# Patient Record
Sex: FEMALE | Race: WHITE | NOT HISPANIC OR LATINO | Employment: UNEMPLOYED | URBAN - NONMETROPOLITAN AREA
[De-identification: names, ages, dates, MRNs, and addresses within clinical notes are randomized per-mention and may not be internally consistent; named-entity substitution may affect disease eponyms.]

---

## 2020-10-01 ENCOUNTER — APPOINTMENT (OUTPATIENT)
Dept: GENERAL RADIOLOGY | Facility: HOSPITAL | Age: 25
End: 2020-10-01

## 2020-10-01 ENCOUNTER — HOSPITAL ENCOUNTER (INPATIENT)
Facility: HOSPITAL | Age: 25
LOS: 5 days | Discharge: HOME | End: 2020-10-06
Attending: PSYCHIATRY & NEUROLOGY | Admitting: PSYCHIATRY & NEUROLOGY

## 2020-10-01 ENCOUNTER — HOSPITAL ENCOUNTER (EMERGENCY)
Facility: HOSPITAL | Age: 25
Discharge: STILL ADMITTED OR ADMINISTRATIVE | End: 2020-10-01
Attending: FAMILY MEDICINE

## 2020-10-01 ENCOUNTER — APPOINTMENT (OUTPATIENT)
Dept: CT IMAGING | Facility: HOSPITAL | Age: 25
End: 2020-10-01

## 2020-10-01 VITALS
BODY MASS INDEX: 31.01 KG/M2 | RESPIRATION RATE: 18 BRPM | DIASTOLIC BLOOD PRESSURE: 95 MMHG | WEIGHT: 175 LBS | SYSTOLIC BLOOD PRESSURE: 139 MMHG | HEART RATE: 95 BPM | TEMPERATURE: 98.9 F | OXYGEN SATURATION: 99 % | HEIGHT: 63 IN

## 2020-10-01 DIAGNOSIS — F11.20 BUPRENORPHINE DEPENDENCE (HCC): ICD-10-CM

## 2020-10-01 DIAGNOSIS — F15.10 AMPHETAMINE ABUSE (HCC): Primary | ICD-10-CM

## 2020-10-01 PROBLEM — F19.10 POLYSUBSTANCE ABUSE (HCC): Status: ACTIVE | Noted: 2020-10-01

## 2020-10-01 LAB
6-ACETYL MORPHINE: NEGATIVE
ALBUMIN SERPL-MCNC: 3.92 G/DL (ref 3.5–5.2)
ALBUMIN/GLOB SERPL: 1.6 G/DL
ALP SERPL-CCNC: 91 U/L (ref 39–117)
ALT SERPL W P-5'-P-CCNC: 14 U/L (ref 1–33)
AMPHET+METHAMPHET UR QL: POSITIVE
ANION GAP SERPL CALCULATED.3IONS-SCNC: 12.2 MMOL/L (ref 5–15)
AST SERPL-CCNC: 15 U/L (ref 1–32)
B-HCG UR QL: NEGATIVE
BARBITURATES UR QL SCN: NEGATIVE
BASOPHILS # BLD AUTO: 0.05 10*3/MM3 (ref 0–0.2)
BASOPHILS # BLD AUTO: 0.05 10*3/MM3 (ref 0–0.2)
BASOPHILS NFR BLD AUTO: 0.7 % (ref 0–1.5)
BASOPHILS NFR BLD AUTO: 0.7 % (ref 0–1.5)
BENZODIAZ UR QL SCN: POSITIVE
BILIRUB SERPL-MCNC: 0.3 MG/DL (ref 0–1.2)
BILIRUB UR QL STRIP: NEGATIVE
BUN SERPL-MCNC: 12 MG/DL (ref 6–20)
BUN/CREAT SERPL: 17.4 (ref 7–25)
BUPRENORPHINE SERPL-MCNC: POSITIVE NG/ML
CALCIUM SPEC-SCNC: 8.3 MG/DL (ref 8.6–10.5)
CANNABINOIDS SERPL QL: POSITIVE
CHLORIDE SERPL-SCNC: 102 MMOL/L (ref 98–107)
CLARITY UR: ABNORMAL
CO2 SERPL-SCNC: 22.8 MMOL/L (ref 22–29)
COCAINE UR QL: NEGATIVE
COLOR UR: ABNORMAL
CREAT SERPL-MCNC: 0.69 MG/DL (ref 0.57–1)
DEPRECATED RDW RBC AUTO: 45.3 FL (ref 37–54)
DEPRECATED RDW RBC AUTO: 46.8 FL (ref 37–54)
EOSINOPHIL # BLD AUTO: 0.21 10*3/MM3 (ref 0–0.4)
EOSINOPHIL # BLD AUTO: 0.32 10*3/MM3 (ref 0–0.4)
EOSINOPHIL NFR BLD AUTO: 3 % (ref 0.3–6.2)
EOSINOPHIL NFR BLD AUTO: 4.5 % (ref 0.3–6.2)
ERYTHROCYTE [DISTWIDTH] IN BLOOD BY AUTOMATED COUNT: 14.5 % (ref 12.3–15.4)
ERYTHROCYTE [DISTWIDTH] IN BLOOD BY AUTOMATED COUNT: 14.6 % (ref 12.3–15.4)
ETHANOL BLD-MCNC: <10 MG/DL (ref 0–10)
ETHANOL UR QL: <0.01 %
GFR SERPL CREATININE-BSD FRML MDRD: 102 ML/MIN/1.73
GLOBULIN UR ELPH-MCNC: 2.4 GM/DL
GLUCOSE SERPL-MCNC: 119 MG/DL (ref 65–99)
GLUCOSE UR STRIP-MCNC: NEGATIVE MG/DL
HCT VFR BLD AUTO: 37.1 % (ref 34–46.6)
HCT VFR BLD AUTO: 37.9 % (ref 34–46.6)
HGB BLD-MCNC: 11.8 G/DL (ref 12–15.9)
HGB BLD-MCNC: 12.3 G/DL (ref 12–15.9)
HGB UR QL STRIP.AUTO: NEGATIVE
IMM GRANULOCYTES # BLD AUTO: 0.05 10*3/MM3 (ref 0–0.05)
IMM GRANULOCYTES # BLD AUTO: 0.05 10*3/MM3 (ref 0–0.05)
IMM GRANULOCYTES NFR BLD AUTO: 0.7 % (ref 0–0.5)
IMM GRANULOCYTES NFR BLD AUTO: 0.7 % (ref 0–0.5)
KETONES UR QL STRIP: NEGATIVE
LEUKOCYTE ESTERASE UR QL STRIP.AUTO: NEGATIVE
LYMPHOCYTES # BLD AUTO: 2.12 10*3/MM3 (ref 0.7–3.1)
LYMPHOCYTES # BLD AUTO: 2.23 10*3/MM3 (ref 0.7–3.1)
LYMPHOCYTES NFR BLD AUTO: 29.9 % (ref 19.6–45.3)
LYMPHOCYTES NFR BLD AUTO: 31.9 % (ref 19.6–45.3)
MAGNESIUM SERPL-MCNC: 2 MG/DL (ref 1.6–2.6)
MCH RBC QN AUTO: 27.8 PG (ref 26.6–33)
MCH RBC QN AUTO: 28 PG (ref 26.6–33)
MCHC RBC AUTO-ENTMCNC: 31.8 G/DL (ref 31.5–35.7)
MCHC RBC AUTO-ENTMCNC: 32.5 G/DL (ref 31.5–35.7)
MCV RBC AUTO: 85.6 FL (ref 79–97)
MCV RBC AUTO: 87.9 FL (ref 79–97)
METHADONE UR QL SCN: NEGATIVE
MONOCYTES # BLD AUTO: 0.52 10*3/MM3 (ref 0.1–0.9)
MONOCYTES # BLD AUTO: 0.71 10*3/MM3 (ref 0.1–0.9)
MONOCYTES NFR BLD AUTO: 10.1 % (ref 5–12)
MONOCYTES NFR BLD AUTO: 7.3 % (ref 5–12)
NEUTROPHILS NFR BLD AUTO: 3.75 10*3/MM3 (ref 1.7–7)
NEUTROPHILS NFR BLD AUTO: 4.04 10*3/MM3 (ref 1.7–7)
NEUTROPHILS NFR BLD AUTO: 53.6 % (ref 42.7–76)
NEUTROPHILS NFR BLD AUTO: 56.9 % (ref 42.7–76)
NITRITE UR QL STRIP: NEGATIVE
NRBC BLD AUTO-RTO: 0 /100 WBC (ref 0–0.2)
NRBC BLD AUTO-RTO: 0 /100 WBC (ref 0–0.2)
OPIATES UR QL: NEGATIVE
OXYCODONE UR QL SCN: NEGATIVE
PCP UR QL SCN: NEGATIVE
PH UR STRIP.AUTO: 6 [PH] (ref 5–8)
PLATELET # BLD AUTO: 244 10*3/MM3 (ref 140–450)
PLATELET # BLD AUTO: 266 10*3/MM3 (ref 140–450)
PMV BLD AUTO: 10 FL (ref 6–12)
PMV BLD AUTO: 10.1 FL (ref 6–12)
POTASSIUM SERPL-SCNC: 3.2 MMOL/L (ref 3.5–5.2)
PROT SERPL-MCNC: 6.3 G/DL (ref 6–8.5)
PROT UR QL STRIP: NEGATIVE
RBC # BLD AUTO: 4.22 10*6/MM3 (ref 3.77–5.28)
RBC # BLD AUTO: 4.43 10*6/MM3 (ref 3.77–5.28)
SARS-COV-2 RNA RESP QL NAA+PROBE: NOT DETECTED
SODIUM SERPL-SCNC: 137 MMOL/L (ref 136–145)
SP GR UR STRIP: >1.03 (ref 1–1.03)
TROPONIN T SERPL-MCNC: <0.01 NG/ML (ref 0–0.03)
UROBILINOGEN UR QL STRIP: ABNORMAL
WBC # BLD AUTO: 7 10*3/MM3 (ref 3.4–10.8)
WBC # BLD AUTO: 7.1 10*3/MM3 (ref 3.4–10.8)

## 2020-10-01 PROCEDURE — 85025 COMPLETE CBC W/AUTO DIFF WBC: CPT | Performed by: FAMILY MEDICINE

## 2020-10-01 PROCEDURE — 80307 DRUG TEST PRSMV CHEM ANLYZR: CPT | Performed by: FAMILY MEDICINE

## 2020-10-01 PROCEDURE — 99285 EMERGENCY DEPT VISIT HI MDM: CPT

## 2020-10-01 PROCEDURE — 81003 URINALYSIS AUTO W/O SCOPE: CPT | Performed by: FAMILY MEDICINE

## 2020-10-01 PROCEDURE — 93010 ELECTROCARDIOGRAM REPORT: CPT | Performed by: INTERNAL MEDICINE

## 2020-10-01 PROCEDURE — 84484 ASSAY OF TROPONIN QUANT: CPT | Performed by: HOSPITALIST

## 2020-10-01 PROCEDURE — 82150 ASSAY OF AMYLASE: CPT | Performed by: HOSPITALIST

## 2020-10-01 PROCEDURE — 87635 SARS-COV-2 COVID-19 AMP PRB: CPT | Performed by: FAMILY MEDICINE

## 2020-10-01 PROCEDURE — 72052 X-RAY EXAM NECK SPINE 6/>VWS: CPT | Performed by: RADIOLOGY

## 2020-10-01 PROCEDURE — 81025 URINE PREGNANCY TEST: CPT | Performed by: FAMILY MEDICINE

## 2020-10-01 PROCEDURE — 83605 ASSAY OF LACTIC ACID: CPT | Performed by: HOSPITALIST

## 2020-10-01 PROCEDURE — 93005 ELECTROCARDIOGRAM TRACING: CPT | Performed by: PSYCHIATRY & NEUROLOGY

## 2020-10-01 PROCEDURE — 74176 CT ABD & PELVIS W/O CONTRAST: CPT

## 2020-10-01 PROCEDURE — 72050 X-RAY EXAM NECK SPINE 4/5VWS: CPT

## 2020-10-01 PROCEDURE — 80053 COMPREHEN METABOLIC PANEL: CPT | Performed by: FAMILY MEDICINE

## 2020-10-01 PROCEDURE — 83690 ASSAY OF LIPASE: CPT | Performed by: HOSPITALIST

## 2020-10-01 PROCEDURE — 80053 COMPREHEN METABOLIC PANEL: CPT | Performed by: HOSPITALIST

## 2020-10-01 PROCEDURE — 71250 CT THORAX DX C-: CPT

## 2020-10-01 PROCEDURE — 99255 IP/OBS CONSLTJ NEW/EST HI 80: CPT | Performed by: HOSPITALIST

## 2020-10-01 PROCEDURE — 85025 COMPLETE CBC W/AUTO DIFF WBC: CPT | Performed by: HOSPITALIST

## 2020-10-01 PROCEDURE — 86140 C-REACTIVE PROTEIN: CPT | Performed by: HOSPITALIST

## 2020-10-01 PROCEDURE — 63710000001 ONDANSETRON PER 8 MG: Performed by: PSYCHIATRY & NEUROLOGY

## 2020-10-01 PROCEDURE — 93005 ELECTROCARDIOGRAM TRACING: CPT | Performed by: HOSPITALIST

## 2020-10-01 PROCEDURE — 83735 ASSAY OF MAGNESIUM: CPT | Performed by: FAMILY MEDICINE

## 2020-10-01 RX ORDER — CYCLOBENZAPRINE HCL 10 MG
10 TABLET ORAL 3 TIMES DAILY PRN
Status: DISCONTINUED | OUTPATIENT
Start: 2020-10-01 | End: 2020-10-06 | Stop reason: HOSPADM

## 2020-10-01 RX ORDER — LORAZEPAM 0.5 MG/1
0.5 TABLET ORAL EVERY 4 HOURS PRN
Status: DISCONTINUED | OUTPATIENT
Start: 2020-10-05 | End: 2020-10-04

## 2020-10-01 RX ORDER — LORAZEPAM 0.5 MG/1
0.5 TABLET ORAL
Status: DISCONTINUED | OUTPATIENT
Start: 2020-10-05 | End: 2020-10-02

## 2020-10-01 RX ORDER — BENZTROPINE MESYLATE 1 MG/ML
1 INJECTION INTRAMUSCULAR; INTRAVENOUS ONCE AS NEEDED
Status: DISCONTINUED | OUTPATIENT
Start: 2020-10-01 | End: 2020-10-06 | Stop reason: HOSPADM

## 2020-10-01 RX ORDER — ECHINACEA PURPUREA EXTRACT 125 MG
2 TABLET ORAL AS NEEDED
Status: DISCONTINUED | OUTPATIENT
Start: 2020-10-01 | End: 2020-10-06 | Stop reason: HOSPADM

## 2020-10-01 RX ORDER — TRAZODONE HYDROCHLORIDE 50 MG/1
50 TABLET ORAL NIGHTLY PRN
Status: DISCONTINUED | OUTPATIENT
Start: 2020-10-01 | End: 2020-10-06 | Stop reason: HOSPADM

## 2020-10-01 RX ORDER — BENZTROPINE MESYLATE 1 MG/1
2 TABLET ORAL ONCE AS NEEDED
Status: DISCONTINUED | OUTPATIENT
Start: 2020-10-01 | End: 2020-10-06 | Stop reason: HOSPADM

## 2020-10-01 RX ORDER — POTASSIUM CHLORIDE 7.45 MG/ML
10 INJECTION INTRAVENOUS
Status: DISCONTINUED | OUTPATIENT
Start: 2020-10-01 | End: 2020-10-06 | Stop reason: HOSPADM

## 2020-10-01 RX ORDER — ALUMINA, MAGNESIA, AND SIMETHICONE 2400; 2400; 240 MG/30ML; MG/30ML; MG/30ML
15 SUSPENSION ORAL EVERY 6 HOURS PRN
Status: DISCONTINUED | OUTPATIENT
Start: 2020-10-01 | End: 2020-10-06 | Stop reason: HOSPADM

## 2020-10-01 RX ORDER — CLONIDINE HYDROCHLORIDE 0.1 MG/1
0.1 TABLET ORAL DAILY PRN
Status: ACTIVE | OUTPATIENT
Start: 2020-10-05 | End: 2020-10-06

## 2020-10-01 RX ORDER — HYDROXYZINE 50 MG/1
50 TABLET, FILM COATED ORAL EVERY 6 HOURS PRN
Status: DISCONTINUED | OUTPATIENT
Start: 2020-10-01 | End: 2020-10-06 | Stop reason: HOSPADM

## 2020-10-01 RX ORDER — ONDANSETRON 4 MG/1
4 TABLET, FILM COATED ORAL EVERY 6 HOURS PRN
Status: DISCONTINUED | OUTPATIENT
Start: 2020-10-01 | End: 2020-10-06 | Stop reason: HOSPADM

## 2020-10-01 RX ORDER — CLONIDINE HYDROCHLORIDE 0.1 MG/1
0.1 TABLET ORAL 3 TIMES DAILY PRN
Status: DISPENSED | OUTPATIENT
Start: 2020-10-03 | End: 2020-10-04

## 2020-10-01 RX ORDER — LORAZEPAM 1 MG/1
1 TABLET ORAL
Status: DISCONTINUED | OUTPATIENT
Start: 2020-10-04 | End: 2020-10-02

## 2020-10-01 RX ORDER — CLONIDINE HYDROCHLORIDE 0.1 MG/1
0.1 TABLET ORAL 4 TIMES DAILY PRN
Status: DISPENSED | OUTPATIENT
Start: 2020-10-02 | End: 2020-10-03

## 2020-10-01 RX ORDER — LORAZEPAM 1 MG/1
1 TABLET ORAL EVERY 4 HOURS PRN
Status: DISCONTINUED | OUTPATIENT
Start: 2020-10-04 | End: 2020-10-04

## 2020-10-01 RX ORDER — CLONIDINE HYDROCHLORIDE 0.1 MG/1
0.1 TABLET ORAL 4 TIMES DAILY PRN
Status: ACTIVE | OUTPATIENT
Start: 2020-10-01 | End: 2020-10-02

## 2020-10-01 RX ORDER — CLONIDINE HYDROCHLORIDE 0.1 MG/1
0.1 TABLET ORAL 2 TIMES DAILY PRN
Status: ACTIVE | OUTPATIENT
Start: 2020-10-04 | End: 2020-10-05

## 2020-10-01 RX ORDER — LOPERAMIDE HYDROCHLORIDE 2 MG/1
2 CAPSULE ORAL
Status: DISCONTINUED | OUTPATIENT
Start: 2020-10-01 | End: 2020-10-06 | Stop reason: HOSPADM

## 2020-10-01 RX ORDER — POTASSIUM CHLORIDE 1.5 G/1.77G
40 POWDER, FOR SOLUTION ORAL AS NEEDED
Status: DISCONTINUED | OUTPATIENT
Start: 2020-10-01 | End: 2020-10-06 | Stop reason: HOSPADM

## 2020-10-01 RX ORDER — POTASSIUM CHLORIDE 20 MEQ/1
20 TABLET, EXTENDED RELEASE ORAL ONCE
Status: COMPLETED | OUTPATIENT
Start: 2020-10-01 | End: 2020-10-01

## 2020-10-01 RX ORDER — POTASSIUM CHLORIDE 20 MEQ/1
20 TABLET, EXTENDED RELEASE ORAL ONCE
Status: COMPLETED | OUTPATIENT
Start: 2020-10-02 | End: 2020-10-02

## 2020-10-01 RX ORDER — BENZONATATE 100 MG/1
100 CAPSULE ORAL 3 TIMES DAILY PRN
Status: DISCONTINUED | OUTPATIENT
Start: 2020-10-01 | End: 2020-10-06 | Stop reason: HOSPADM

## 2020-10-01 RX ORDER — LORAZEPAM 2 MG/1
2 TABLET ORAL EVERY 4 HOURS PRN
Status: ACTIVE | OUTPATIENT
Start: 2020-10-02 | End: 2020-10-03

## 2020-10-01 RX ORDER — ACETAMINOPHEN 325 MG/1
650 TABLET ORAL EVERY 6 HOURS PRN
Status: DISCONTINUED | OUTPATIENT
Start: 2020-10-01 | End: 2020-10-06 | Stop reason: HOSPADM

## 2020-10-01 RX ORDER — LORAZEPAM 2 MG/1
2 TABLET ORAL
Status: DISCONTINUED | OUTPATIENT
Start: 2020-10-02 | End: 2020-10-02

## 2020-10-01 RX ORDER — POTASSIUM CHLORIDE 20 MEQ/1
40 TABLET, EXTENDED RELEASE ORAL ONCE
Status: COMPLETED | OUTPATIENT
Start: 2020-10-02 | End: 2020-10-01

## 2020-10-01 RX ORDER — LORAZEPAM 2 MG/1
2 TABLET ORAL
Status: DISPENSED | OUTPATIENT
Start: 2020-10-01 | End: 2020-10-02

## 2020-10-01 RX ORDER — POTASSIUM CHLORIDE 750 MG/1
40 CAPSULE, EXTENDED RELEASE ORAL AS NEEDED
Status: DISCONTINUED | OUTPATIENT
Start: 2020-10-01 | End: 2020-10-06 | Stop reason: HOSPADM

## 2020-10-01 RX ORDER — FAMOTIDINE 20 MG/1
20 TABLET, FILM COATED ORAL 2 TIMES DAILY PRN
Status: DISCONTINUED | OUTPATIENT
Start: 2020-10-01 | End: 2020-10-06 | Stop reason: HOSPADM

## 2020-10-01 RX ORDER — DICYCLOMINE HYDROCHLORIDE 10 MG/1
10 CAPSULE ORAL 3 TIMES DAILY PRN
Status: DISCONTINUED | OUTPATIENT
Start: 2020-10-01 | End: 2020-10-06 | Stop reason: HOSPADM

## 2020-10-01 RX ORDER — CLONIDINE HYDROCHLORIDE 0.1 MG/1
0.1 TABLET ORAL 4 TIMES DAILY PRN
Status: DISCONTINUED | OUTPATIENT
Start: 2020-10-01 | End: 2020-10-01

## 2020-10-01 RX ADMIN — CYCLOBENZAPRINE HYDROCHLORIDE 10 MG: 10 TABLET, FILM COATED ORAL at 18:15

## 2020-10-01 RX ADMIN — TRAZODONE HYDROCHLORIDE 50 MG: 50 TABLET ORAL at 21:21

## 2020-10-01 RX ADMIN — HYDROXYZINE HYDROCHLORIDE 50 MG: 50 TABLET ORAL at 18:15

## 2020-10-01 RX ADMIN — ACETAMINOPHEN 650 MG: 325 TABLET ORAL at 18:15

## 2020-10-01 RX ADMIN — ONDANSETRON HYDROCHLORIDE 4 MG: 4 TABLET, FILM COATED ORAL at 18:15

## 2020-10-01 RX ADMIN — DICYCLOMINE HYDROCHLORIDE 10 MG: 10 CAPSULE ORAL at 18:15

## 2020-10-01 RX ADMIN — POTASSIUM CHLORIDE 20 MEQ: 20 TABLET, EXTENDED RELEASE ORAL at 16:33

## 2020-10-01 RX ADMIN — LORAZEPAM 2 MG: 2 TABLET ORAL at 22:24

## 2020-10-01 RX ADMIN — POTASSIUM CHLORIDE 40 MEQ: 20 TABLET, EXTENDED RELEASE ORAL at 23:10

## 2020-10-01 NOTE — ED NOTES
Called lab for blood work. Me and another tech looked for veins and was unable to find anything to stick. I even looked in the shoulder, and there was nothing. Talked to Betty, she said that it would be just a little bit.      Bridget Lowe  10/01/20 8729
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
## 2020-10-01 NOTE — ED NOTES
Stuck patient 2 times for blood work. Was unable to obtain. Called Resp. For restick. Patient has agreed for resp. To stick her for blood work.      Bridget Lowe  10/01/20 1551

## 2020-10-01 NOTE — ED PROVIDER NOTES
Subjective   This is a 25-year-old female who presents to the emergency department chief complaint requesting drug detox.  Patient states has been abusing Xanax and Suboxone.  She states she has been taking this by snorting.  Patient denies any alcohol abuse.  Denies any suicidal homicidal ideation.  No known medical issues.      History provided by:  Patient   used: No    Addiction Problem  Location:  Suboxone and xanax   Severity:  Moderate  Onset quality:  Gradual  Timing:  Intermittent  Progression:  Worsening  Chronicity:  New  Associated symptoms: no abdominal pain, no chest pain, no congestion, no cough, no fever, no headaches, no loss of consciousness, no myalgias, no rash, no rhinorrhea, no shortness of breath and no vomiting        Review of Systems   Constitutional: Negative.  Negative for fever.   HENT: Negative.  Negative for congestion and rhinorrhea.    Eyes: Negative.  Negative for pain, redness and itching.   Respiratory: Negative.  Negative for apnea, cough, choking, chest tightness and shortness of breath.    Cardiovascular: Negative.  Negative for chest pain.   Gastrointestinal: Negative for abdominal pain and vomiting.   Genitourinary: Negative.  Negative for difficulty urinating, dyspareunia, dysuria, enuresis and genital sores.   Musculoskeletal: Negative.  Negative for arthralgias, back pain, gait problem and myalgias.   Skin: Negative.  Negative for color change, pallor and rash.   Neurological: Negative for loss of consciousness and headaches.   All other systems reviewed and are negative.      Past Medical History:   Diagnosis Date   • Anxiety    • Bipolar disorder (CMS/HCC)    • Depression    • Migraine    • Obsessive-compulsive disorder    • PCOS (polycystic ovarian syndrome)    • Suicide attempt (CMS/HCC)    • Withdrawal symptoms, drug or narcotic (CMS/HCC)        Allergies   Allergen Reactions   • Nsaids Rash       Past Surgical History:   Procedure Laterality Date    •  SECTION      2018   • GASTRIC SLEEVE LAPAROSCOPIC             No family history on file.    Social History     Socioeconomic History   • Marital status: Legally      Spouse name: Not on file   • Number of children: Not on file   • Years of education: Not on file   • Highest education level: Not on file   Tobacco Use   • Smoking status: Former Smoker   • Smokeless tobacco: Never Used   Substance and Sexual Activity   • Alcohol use: Yes     Comment: occaisional   • Drug use: Yes   • Sexual activity: Yes     Partners: Male     Birth control/protection: None           Objective   Physical Exam  Vitals signs and nursing note reviewed.   Constitutional:       General: She is not in acute distress.     Appearance: Normal appearance. She is not ill-appearing, toxic-appearing or diaphoretic.   HENT:      Head: Normocephalic and atraumatic.      Right Ear: Tympanic membrane, ear canal and external ear normal. There is no impacted cerumen.      Left Ear: Tympanic membrane, ear canal and external ear normal. There is no impacted cerumen.      Nose: Nose normal. No congestion or rhinorrhea.      Mouth/Throat:      Mouth: Mucous membranes are dry.      Pharynx: Oropharynx is clear. No oropharyngeal exudate or posterior oropharyngeal erythema.   Eyes:      General: No scleral icterus.        Right eye: No discharge.         Left eye: No discharge.      Extraocular Movements: Extraocular movements intact.      Conjunctiva/sclera: Conjunctivae normal.      Pupils: Pupils are equal, round, and reactive to light.   Neck:      Musculoskeletal: Normal range of motion. No neck rigidity or muscular tenderness.      Vascular: No carotid bruit.   Cardiovascular:      Rate and Rhythm: Normal rate and regular rhythm.      Pulses: Normal pulses.      Heart sounds: Normal heart sounds. No murmur. No friction rub. No gallop.    Pulmonary:      Effort: Pulmonary effort is normal. No respiratory distress.      Breath  sounds: Normal breath sounds. No stridor. No wheezing, rhonchi or rales.   Chest:      Chest wall: No tenderness.   Abdominal:      General: Abdomen is flat. There is no distension.      Palpations: Abdomen is soft. There is no mass.      Tenderness: There is no abdominal tenderness. There is no left CVA tenderness, guarding or rebound.      Hernia: No hernia is present.   Musculoskeletal: Normal range of motion.         General: No swelling, tenderness, deformity or signs of injury.   Lymphadenopathy:      Cervical: No cervical adenopathy.   Skin:     General: Skin is warm and dry.      Coloration: Skin is not jaundiced or pale.      Findings: No bruising, erythema or rash.   Neurological:      General: No focal deficit present.      Mental Status: She is alert. Mental status is at baseline. She is disoriented.      Cranial Nerves: No cranial nerve deficit.      Sensory: No sensory deficit.      Motor: No weakness.      Coordination: Coordination normal.      Gait: Gait normal.      Deep Tendon Reflexes: Reflexes normal.   Psychiatric:         Mood and Affect: Mood is anxious.         Speech: She is communicative. Speech is not rapid and pressured, delayed or slurred.         Behavior: Behavior normal.         Thought Content: Thought content normal.         Procedures           ED Course  ED Course as of Oct 01 1545   Thu Oct 01, 2020   1541 Discussed care with patient. Medically cleared for detox.     [BH]      ED Course User Index  [] Silvano Mcintyre PA-C                                           [facility]    Final diagnoses:   Amphetamine abuse (CMS/HCC)   Buprenorphine dependence (CMS/HCC)            Silvano Mcintyre PA-C  10/01/20 1545

## 2020-10-01 NOTE — NURSING NOTE
ED provider present and talking with patient. No apparent injuries. Instructed he will order some xrays just to be sure. Patient verbalized understanding.
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
## 2020-10-01 NOTE — NURSING NOTE
Patient asked to go to the bathroom. Then the patient stated that when she sat on the toilet she put her head back and felt dizzy and slid down off the toilet hitting her neck on the toilet. Patient checked head to toe and no other apparent injuries noted. No redness noted to left side of neck. She states it is sore. ED provider made aware. VS taken and pt stable.

## 2020-10-01 NOTE — NURSING NOTE
Patient returned from radiology. Patient denies any pain or discomfort at this time. Safety precautions maintained.
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
## 2020-10-02 LAB
ALBUMIN SERPL-MCNC: 3.6 G/DL (ref 3.5–5.2)
ALBUMIN/GLOB SERPL: 1.6 G/DL
ALP SERPL-CCNC: 80 U/L (ref 39–117)
ALT SERPL W P-5'-P-CCNC: 15 U/L (ref 1–33)
AMYLASE SERPL-CCNC: 64 U/L (ref 28–100)
ANION GAP SERPL CALCULATED.3IONS-SCNC: 10.1 MMOL/L (ref 5–15)
AST SERPL-CCNC: 14 U/L (ref 1–32)
BILIRUB SERPL-MCNC: 0.3 MG/DL (ref 0–1.2)
BUN SERPL-MCNC: 11 MG/DL (ref 6–20)
BUN/CREAT SERPL: 14.1 (ref 7–25)
CALCIUM SPEC-SCNC: 8.2 MG/DL (ref 8.6–10.5)
CHLORIDE SERPL-SCNC: 103 MMOL/L (ref 98–107)
CO2 SERPL-SCNC: 24.9 MMOL/L (ref 22–29)
CREAT SERPL-MCNC: 0.78 MG/DL (ref 0.57–1)
CRP SERPL-MCNC: 0.82 MG/DL (ref 0–0.5)
D-LACTATE SERPL-SCNC: 1.8 MMOL/L (ref 0.5–2)
GFR SERPL CREATININE-BSD FRML MDRD: 89 ML/MIN/1.73
GLOBULIN UR ELPH-MCNC: 2.3 GM/DL
GLUCOSE SERPL-MCNC: 112 MG/DL (ref 65–99)
HAV IGM SERPL QL IA: ABNORMAL
HBV CORE IGM SERPL QL IA: ABNORMAL
HBV SURFACE AG SERPL QL IA: ABNORMAL
HCV AB SER DONR QL: REACTIVE
HOLD SPECIMEN: NORMAL
LIPASE SERPL-CCNC: 34 U/L (ref 13–60)
POTASSIUM SERPL-SCNC: 3.9 MMOL/L (ref 3.5–5.2)
POTASSIUM SERPL-SCNC: 4.3 MMOL/L (ref 3.5–5.2)
PROT SERPL-MCNC: 5.9 G/DL (ref 6–8.5)
SODIUM SERPL-SCNC: 138 MMOL/L (ref 136–145)
TROPONIN T SERPL-MCNC: <0.01 NG/ML (ref 0–0.03)

## 2020-10-02 PROCEDURE — 84132 ASSAY OF SERUM POTASSIUM: CPT | Performed by: PSYCHIATRY & NEUROLOGY

## 2020-10-02 PROCEDURE — 99223 1ST HOSP IP/OBS HIGH 75: CPT | Performed by: PSYCHIATRY & NEUROLOGY

## 2020-10-02 PROCEDURE — 80074 ACUTE HEPATITIS PANEL: CPT | Performed by: PSYCHIATRY & NEUROLOGY

## 2020-10-02 RX ADMIN — CLONIDINE HYDROCHLORIDE 0.1 MG: 0.1 TABLET ORAL at 19:46

## 2020-10-02 RX ADMIN — LORAZEPAM 2 MG: 2 TABLET ORAL at 00:16

## 2020-10-02 RX ADMIN — POTASSIUM CHLORIDE 20 MEQ: 20 TABLET, EXTENDED RELEASE ORAL at 03:45

## 2020-10-02 RX ADMIN — LORAZEPAM 2 MG: 2 TABLET ORAL at 09:15

## 2020-10-02 NOTE — H&P
INITIAL PSYCHIATRIC HISTORY & PHYSICAL    Patient Identification:  Name:  Deepthi Zuñiga  Age:  25 y.o.  Sex:  female  :  1995  MRN:  [id number]   Visit Number:  [id number]  Primary Care Physician:  Provider, No Known    SUBJECTIVE    CC/Focus of Exam: polysubstance abuse    HPI: Deepthi Zuñiga is a 25 y.o. female who was admitted on 10/1/2020 with complaints of polysubstance abuse. She has a recent history of abusing methamphetamine, buprenorphine, alprazolam.  She experienced severe abdominal and back pain yesterday, prompting him consult to medicine.  Symptoms considered to be related to withdrawal.  This morning, she was very uncomfortable, at times restless, anxious and other times almost lethargic and somnolent.  She was difficult to obtain much history from.  She struggled to report how much of the above-mentioned drugs she had taken.  She began to fall asleep during our conversation and for concern of her safety, we ended the discussion for her to go rest so she can recover and be more engaged with treatment.    PAST PSYCHIATRIC HX:  Unable to obtain    SUBSTANCE USE HX:  Per HPI, but full details were unable to be obtained due to patient's mental status    SOCIAL HX:  Unable to obtain    Past Medical History:   Diagnosis Date   • Anxiety    • Bipolar disorder (CMS/HCC)    • Depression    • Migraine    • Obsessive-compulsive disorder    • PCOS (polycystic ovarian syndrome)    • Suicide attempt (CMS/HCC)    • Withdrawal symptoms, drug or narcotic (CMS/HCC)           Past Surgical History:   Procedure Laterality Date   •  SECTION         • GASTRIC SLEEVE LAPAROSCOPIC             History reviewed. No pertinent family history.      No medications prior to admission.         ALLERGIES:  Nsaids    Temp:  [97.4 °F (36.3 °C)-98.9 °F (37.2 °C)] 98.1 °F (36.7 °C)  Heart Rate:  [] 99  Resp:  [18] 18  BP: (118-152)/(71-99) 152/99    REVIEW OF SYSTEMS:  Review of Systems   Constitutional:  Positive for fatigue.   Musculoskeletal: Positive for myalgias.   Psychiatric/Behavioral: Positive for confusion, decreased concentration and sleep disturbance. The patient is nervous/anxious.    All other systems reviewed and are negative.       OBJECTIVE    PHYSICAL EXAM:  Physical Exam  Vitals signs and nursing note reviewed.   Constitutional:       Appearance: She is well-developed.   HENT:      Head: Normocephalic and atraumatic.      Right Ear: External ear normal.      Left Ear: External ear normal.      Nose: Nose normal.   Eyes:      Pupils: Pupils are equal, round, and reactive to light.   Neck:      Musculoskeletal: Normal range of motion and neck supple.   Cardiovascular:      Rate and Rhythm: Normal rate and regular rhythm.   Pulmonary:      Effort: Pulmonary effort is normal. No respiratory distress.      Breath sounds: Normal breath sounds.   Abdominal:      General: There is no distension.      Palpations: Abdomen is soft.   Musculoskeletal: Normal range of motion.         General: No deformity.   Skin:     General: Skin is warm.      Findings: No rash.   Neurological:      Mental Status: She is alert and oriented to person, place, and time.      Coordination: Coordination normal.         MENTAL STATUS EXAM:   Hygiene:   poor  Cooperation:  Limited by fatigue/somnolence  Eye Contact:  Poor  Psychomotor Behavior:  Slow  Affect:  Restricted  Hopelessness: 3  Speech:  Minimal  Thought Progress:  Disorganized  Thought Content:  Mood congruent  Suicidal:  None  Homicidal:  None  Hallucinations:  Not demonstrated today  Delusion:  Unable to demonstrate  Memory:  Deficits  Orientation:  Person  Reliability:  poor  Insight:  Poor  Judgement:  Poor  Impulse Control:  Poor      Imaging Results (Last 24 Hours)     Procedure Component Value Units Date/Time    CT Abdomen Pelvis Without Contrast [[id number]] Collected: 10/02/20 0105     Updated: 10/02/20 0107    Narrative:      CT ABDOMEN AND PELVIS, NONCONTRAST,  10/2/2020    HISTORY:  25-year-old female referred for acute onset epigastric pain and chest pain today.    TECHNIQUE:  CT imaging of the abdomen and pelvis ordered without oral or IV contrast. Radiation dose reduction techniques included automated exposure control. Radiation audit for CT and nuclear cardiology exams in the last 12 months: 0.    ABDOMEN FINDINGS:  Both kidneys, both ureters and urinary bladder are normal in noncontrast CT appearance. No visible nephrolithiasis or evidence of urinary obstruction.    The gallbladder is nondistended. There is no bile duct dilatation. Liver, pancreas and spleen are normal in size and appearance without contrast.    Small bowel and colon are normal in caliber and appearance, as imaged. The appendix is normal. The stomach is nondistended.    PELVIS FINDINGS:  Large left ovary cyst measuring about 5.1 x 4.1 cm. Uterus and right ovary appear normal. Urinary bladder and rectum are negative. No significant free pelvic fluid. No inguinal hernia.      Impression:      1.  Large left ovary cyst measuring up to 5 cm. Recommend follow-up pelvic ultrasound examination in 6-8 weeks.  2.  No acute abnormality within the abdomen or pelvis.  3.  No nephrolithiasis or evidence of urinary obstruction.  4.  Normal appendix.    Signer Name: Robert Stuart MD   Signed: 10/2/2020 1:05 AM   Workstation Name: [id number]    [facility]    CT Chest Without Contrast [[id number]] Collected: 10/02/20 0027     Updated: 10/02/20 0029    Narrative:      CT CHEST, NONCONTRAST, 10/2/2020    HISTORY:  25-year-old female referred for acute onset epigastric pain and chest pain today.    TECHNIQUE:  CT imaging of the chest ordered without IV contrast. Radiation dose reduction techniques included automated exposure control. Radiation audit for CT and nuclear cardiology exams in the last 12 months: 0.    FINDINGS:  The lungs are expanded and clear. There is no focal, multifocal  or diffuse pulmonary infiltrate. There is no pleural effusion. Trachea and central bronchi are normal in caliber and widely patent.    Heart size is normal. There is no pericardial effusion. Thoracic aorta is normal in caliber.    No mass or adenopathy within the chest. No hiatal hernia or thoracic esophageal dilatation. No rib fracture or other chest wall abnormality is seen.      Impression:      Negative noncontrast chest CT examination.    Signer Name: Robert Stuart MD   Signed: 10/2/2020 12:27 AM   Workstation Name: [email or web address]    [facility]           ECG/EMG Results (most recent)     Procedure Component Value Units Date/Time    ECG 12 Lead [[id number]] Collected: 10/01/20 1749     Updated: 10/02/20 1039    Narrative:      Test Reason : Baseline Cardiac Status  Blood Pressure : **/** mmHG  Vent. Rate : 082 BPM     Atrial Rate : 082 BPM     P-R Int : 146 ms          QRS Dur : 084 ms      QT Int : 402 ms       P-R-T Axes : 016 083 055 degrees     QTc Int : 469 ms    Normal sinus rhythm  Normal ECG  No previous ECGs available  Confirmed by Ligia Fortune ([id number]) on 10/2/2020 10:39:44 AM    Referred By:  YEMI           Confirmed By:Ligia Fortune    ECG 12 Lead [[id number]] Collected: 10/01/20 2303     Updated: 10/02/20 1042    Narrative:      Test Reason : epigastric pain/some chest pain? acute onset  Blood Pressure : **/** mmHG  Vent. Rate : 071 BPM     Atrial Rate : 071 BPM     P-R Int : 178 ms          QRS Dur : 084 ms      QT Int : 442 ms       P-R-T Axes : 056 073 057 degrees     QTc Int : 480 ms    Sinus rhythm with occasional premature ventricular complexes    Borderline ECG  When compared with ECG of 01-OCT-2020 17:49, (Unconfirmed)  premature ventricular complexes are now present  Confirmed by Ligia Fortune ([id number]) on 10/2/2020 10:42:48 AM    Referred By:  JOANNE           Confirmed By:Ligia Fortune           Lab Results   Component Value Date    GLUCOSE 112 (H)  10/01/2020    BUN 11 10/01/2020    CREATININE 0.78 10/01/2020    EGFRIFNONA 89 10/01/2020    BCR 14.1 10/01/2020    CO2 24.9 10/01/2020    CALCIUM 8.2 (L) 10/01/2020    ALBUMIN 3.60 10/01/2020    AST 14 10/01/2020    ALT 15 10/01/2020       Lab Results   Component Value Date    WBC 7.00 10/01/2020    HGB 11.8 (L) 10/01/2020    HCT 37.1 10/01/2020    MCV 87.9 10/01/2020     10/01/2020       Last Urine Toxicity     LAST URINE TOXICITY RESULTS Latest Ref Rng & Units 10/1/2020 9/29/2020    AMPHETAMINES SCREEN, URINE Negative Positive(A) -    BARBITURATES SCREEN Negative Negative None Detected    BENZODIAZEPINE SCREEN, URINE Negative Positive(A) None Detected    BUPRENORPHINEUR Negative Positive(A) -    COCAINE SCREEN, URINE Negative Negative None Detected    METHADONE SCREEN, URINE Negative Negative -          Brief Urine Lab Results  (Last result in the past 365 days)      Color   Clarity   Blood   Leuk Est   Nitrite   Protein   CREAT   Urine HCG        10/01/20 1411               Negative     10/01/20 1411 Dark Yellow Cloudy Negative Negative Negative Negative               Chart, notes, vitals, labs and EKG personally reviewed.    ASSESSMENT & PLAN:        Polysubstance abuse (CMS/[facility])    Benzodiazepine use disorder, severe, dependence  -Most concerning substance of withdrawal is reported use of alprazolam.  However, patient is so lethargic today, we will continue as needed dosing based on CIWA scoring and determine the severity of expected withdrawal when mental status improves  -Ativan detox protocol; continue as needed dosing for now given patient's lethargy  -Refer to rehab    Opioid use disorder, severe, dependence  -Patient reports regular use of buprenorphine  -Clonidine protocol  -Refer to rehab    Methamphetamine use disorder, severe, dependence  -Regular reported methamphetamine use  -Supportive care  -Refer to rehab    Abdominal pain  -Medicine consulted overnight for severe abdominal pain.   Appreciate involvement  -No emergent findings.  Thought to be related to withdrawal thus far  -10/2/20 abdominal CT with no signs of appendicitis or intra-abdominal pathology likely to cause severe abdominal pain.  Chest CT with no acute findings.  Pelvic CT did visualize left ovarian cyst 5 cm    Ovarian cyst  -Observed on 10/2/2020 pelvic CT  -Appears intact.  Unlikely to be causing acute pain  -Ultrasound in 6 to 8 weeks recommended    Hepatitis C  -Found on admission hepatitis panel  -We will  patient once mental status improves    The patient has been admitted for safety and stabilization.  Patient will be monitored for suicidality daily and maintained on Special Precautions Level 4 (q30 min checks).  The patient will have individual and group therapy with a master's level therapist. A master treatment plan will be developed and agreed upon by the patient and his/her treatment team.  The patient's estimated length of stay in the hospital is 5-7 days.
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
## 2020-10-02 NOTE — CONSULTS
"Hospitalist Consult Note        Patient Identification  Name: Deepthi Zuñiga  Age/Sex: 25 y.o. female  :  1995        MRN: [id number]  Visit Number: [id number]  PCP: Provider, No Known    Inpatient Hospitalist Consult  Consult performed by: Wang Car MD  Consult ordered by: Jeffrey Mendiola MD          Referring Provider: Dr Mendiola  Reason for Consultation: Acute onset abdominal pain and back pain      History of present illness: Patient is a 25 year old female with history of bipolar disorder, migraine, OCD, PCOS and substance abuse who presented to the ED earlier today requesting drug detox. She has been abusing xanax, suboxone and methamphetamine. UDS was positive for all of these as well as THC. She was medically cleared in the ED and admitted to the [facility]. This evening, nursing staff contacted the hospitalist service at the request of the on-call psychiatrist for medical evaluation of new onset severe abdominal pain and back pain. The patient is currently screaming out in pain. She is writhing around in the bed at times. She says her epigastric region is burning and going through to her back. Initially when asked about chest pain she said no, then said \"well maybe a little, I don't know\". She begged at one time for us to just knock her out. She states she has been through withdrawal before but this pain is very different and she is adamant that it is not related to withdrawal. She denies tobacco or alcohol use. She denies history of pancreatitis or peptic ulcer disease. Obtaining history is difficult due to patient being very short with her answers and screaming out in pain.     Review of Systems  Review of Systems   Unable to perform ROS: Other (patient in distress due to pain)   Eyes: Negative for photophobia, pain, discharge, redness, itching and visual disturbance.   Respiratory: Negative for cough and shortness of breath.    Cardiovascular: Positive for chest pain. Negative " for leg swelling.   Gastrointestinal: Positive for abdominal pain and constipation (says she hasn't had a good bowel movement in several days). Negative for diarrhea, nausea and vomiting.   Genitourinary: Negative for difficulty urinating, dysuria and flank pain.   Musculoskeletal: Positive for back pain. Negative for arthralgias, myalgias, neck pain and neck stiffness.   Skin: Negative for color change and wound.   Neurological: Negative for dizziness, tremors, weakness and headaches.   Hematological: Negative for adenopathy. Does not bruise/bleed easily.   Psychiatric/Behavioral: Positive for agitation. Negative for behavioral problems and confusion.       History  Past Medical History:   Diagnosis Date   • Anxiety    • Bipolar disorder (CMS/HCC)    • Depression    • Migraine    • Obsessive-compulsive disorder    • PCOS (polycystic ovarian syndrome)    • Suicide attempt (CMS/HCC)    • Withdrawal symptoms, drug or narcotic (CMS/HCC)       Past Surgical History:   Procedure Laterality Date   •  SECTION         • GASTRIC SLEEVE LAPAROSCOPIC         ,  History reviewed. No pertinent family history.   Social History     Tobacco Use   • Smoking status: Former Smoker   • Smokeless tobacco: Never Used   Substance Use Topics   • Alcohol use: Yes     Comment: occaisional   • Drug use: Yes      ALLERGIES: Nsaids    Objective     Vital Signs   Temp:  [97.4 °F (36.3 °C)-98.9 °F (37.2 °C)] 98.3 °F (36.8 °C)  Heart Rate:  [] 104  Resp:  [18] 18  BP: (118-149)/(82-97) 118/82      10/01/20  1650   Weight: 85.7 kg (189 lb)     Body mass index is 33.48 kg/m².    Physical Exam:  Physical Exam  Constitutional:       General: She is in acute distress (due to abdominal/back burning).      Appearance: Normal appearance. She is not toxic-appearing or diaphoretic.   HENT:      Head: Normocephalic and atraumatic.      Right Ear: External ear normal.      Left Ear: External ear normal.      Nose: Nose normal.       Mouth/Throat:      Mouth: Mucous membranes are moist.      Pharynx: Oropharynx is clear.   Eyes:      Extraocular Movements: Extraocular movements intact.      Conjunctiva/sclera: Conjunctivae normal.      Pupils: Pupils are equal, round, and reactive to light.   Cardiovascular:      Rate and Rhythm: Regular rhythm. Tachycardia present.      Pulses: Normal pulses.   Pulmonary:      Effort: Pulmonary effort is normal. No respiratory distress.      Breath sounds: Normal breath sounds. No wheezing, rhonchi or rales.   Abdominal:      General: Abdomen is flat. Bowel sounds are normal. There is no distension.      Palpations: Abdomen is soft.      Tenderness: There is abdominal tenderness (mild-moderate tenderness epigastric region).   Musculoskeletal: Normal range of motion.         General: No swelling, tenderness or deformity.   Skin:     General: Skin is warm and dry.      Capillary Refill: Capillary refill takes less than 2 seconds.   Neurological:      General: No focal deficit present.      Mental Status: She is alert and oriented to person, place, and time.   Psychiatric:      Comments: Anxious, agitated, in distress which she attributed to burning in abdomen and back         Results Review:    Results from last 7 days   Lab Units 10/01/20  1606 09/29/20  1752   WBC 10*3/mm3 7.10 7.7   HEMOGLOBIN g/dL 12.3 14.0   PLATELETS 10*3/mm3 266 283         Results from last 7 days   Lab Units 10/01/20  1606   SODIUM mmol/L 137   POTASSIUM mmol/L 3.2*   CHLORIDE mmol/L 102   CO2 mmol/L 22.8   BUN mg/dL 12   CREATININE mg/dL 0.69   CALCIUM mg/dL 8.3*   GLUCOSE mg/dL 119*     Results from last 7 days   Lab Units 10/01/20  1606   MAGNESIUM mg/dL 2.0     No results found for: HGBA1C  Results from last 7 days   Lab Units 10/01/20  1606   BILIRUBIN mg/dL 0.3   ALK PHOS U/L 91   AST (SGOT) U/L 15   ALT (SGPT) U/L 14                        I have reviewed the patient's laboratory results.     Imaging Results (Last 72 Hours)     **  No results found for the last 72 hours. **      XR cervical spine: unremarkable per radiology    I have personally reviewed the patient's radiologic imaging.          Assessment/Plan     - Polysubstance abuse (CMS/HCC): admits to abusing xanax, suboxone and methamphetamine. Psychiatry currently managing.     - Acute onset epigastric/mid back pain: suspect related to withdrawal symptoms. Medical workup in the ED was unremarkable, but at that time patient was not having said symptoms. Will obtain STAT EKG and troponin to rule out cardiac ischemia. Check lipase and amylase to investigate for possible pancreatitis. Repeat CBC, CMP to see if there have been any significant changes since her ED eval and check CRP and lactate as well. Obtain STAT CT chest and abdomen/pelvis to evaluate for organic causes of pain ie pneumonia, pancreatitis, cholecystitis, etc.     - Mild hypokalemia: replace per protocol. Magnesium was normal in the ED.     Thank you for the consultation. Will continue to follow the patient with you.    I discussed the patients findings and my recommendations with patient and her RN Sharri who was present during my interview and exam in the Detox unit of the [facility].     Wang Car MD  10/01/20  22:40 EDT

## 2020-10-02 NOTE — PROGRESS NOTES
Hospitalist note    Patient was not physically seen by myself today.  However, extensive chart review was performed.  Also discussed with patient's nurse her overall condition.  According to patient's nurse, patient is somewhat somnolent this morning but is arousable to physical stimuli.  She is currently not complaining of any pain.  Review of CT chest, abdomen and pelvis demonstrates no acute pathology.  Incidental note is made of a ovarian cyst which may be followed up in outpatient setting.  All lab work is within normal limits.  Suspect abdominal pain reported yesterday was likely secondary to withdrawal symptoms.  We will continue to follow closely.

## 2020-10-02 NOTE — PLAN OF CARE
Goal Outcome Evaluation:  Plan of Care Reviewed With: patient  Progress: no change   Pt drowsy and staying in bed most of the day.Pt confused at times and disoriented to place and time.

## 2020-10-03 PROCEDURE — 63710000001 ONDANSETRON PER 8 MG: Performed by: PSYCHIATRY & NEUROLOGY

## 2020-10-03 PROCEDURE — 99232 SBSQ HOSP IP/OBS MODERATE 35: CPT | Performed by: PSYCHIATRY & NEUROLOGY

## 2020-10-03 RX ADMIN — ONDANSETRON HYDROCHLORIDE 4 MG: 4 TABLET, FILM COATED ORAL at 08:23

## 2020-10-03 RX ADMIN — HYDROXYZINE HYDROCHLORIDE 50 MG: 50 TABLET ORAL at 08:23

## 2020-10-03 RX ADMIN — DICYCLOMINE HYDROCHLORIDE 10 MG: 10 CAPSULE ORAL at 08:23

## 2020-10-03 RX ADMIN — CYCLOBENZAPRINE HYDROCHLORIDE 10 MG: 10 TABLET, FILM COATED ORAL at 08:23

## 2020-10-03 RX ADMIN — LORAZEPAM 1.5 MG: 1 TABLET ORAL at 08:23

## 2020-10-03 RX ADMIN — HYDROXYZINE HYDROCHLORIDE 50 MG: 50 TABLET ORAL at 21:32

## 2020-10-03 RX ADMIN — ACETAMINOPHEN 650 MG: 325 TABLET ORAL at 08:23

## 2020-10-03 RX ADMIN — CLONIDINE HYDROCHLORIDE 0.1 MG: 0.1 TABLET ORAL at 21:32

## 2020-10-03 RX ADMIN — CLONIDINE HYDROCHLORIDE 0.1 MG: 0.1 TABLET ORAL at 08:23

## 2020-10-03 NOTE — PROGRESS NOTES
Hospitalist note    Patient not physically seen by myself today.  Did discuss patient's case with nursing staff, per nursing staff, patient is currently sleeping and has no complaints today.  Specifically, she does not complain of abdominal pain, nausea, vomiting or diarrhea.  No new events overnight.  Vital signs do appear stable for a patient going through withdrawal.

## 2020-10-03 NOTE — PLAN OF CARE
Goal Outcome Evaluation:  Plan of Care Reviewed With: patient  Progress: improving  Outcome Summary: Patient rates Anx 4 Dep 2 Denies Si, HI, or AVh reports fair appetite and sleeping  8 hours last night. She has gotten up to eat and returned to bed although she has been easily aroused. She states she is no longer nautious or having abdominal pain. She is calm, cooperative, while being pleasant to peers and staff. Her gait is steady and is oriented.

## 2020-10-03 NOTE — PROGRESS NOTES
"    Detox Recovery Unit Progress Note   Clinician: Jeffrey Mendiola MD  Admission Date: 10/1/2020  09:58 EDT 10/03/20    Behavioral Health Treatment Plan and Problem List: I have reviewed and approved the Behavioral Health Treatment Plan and Problem list.    Allergies  Allergies   Allergen Reactions   • Nsaids Rash       Hospital Day: 2 days      Assessment completed within view of staff    History  CC: withdrawal symptoms    Interval HPI: Patient seen and evaluated by me.  Chart reviewed. Patient is withdrawing from methamphetamine, Suboxone, alprazolam  Current withdrawal symptoms include: Fatigue, lethargy, sweating, body aches, malaise ROS otherwise as below.    Interval Review of Systems:   General ROS: negative for - fever.  Positive for withdrawal symptoms mentioned above.  Endocrine ROS: negative for - palpitations  Respiratory ROS: no cough, shortness of breath, or wheezing  Cardiovascular ROS: no chest pain or dyspnea on exertion  Gastrointestinal ROS: no abdominal pain,no black or bloody stools    /89 (BP Location: Right arm, Patient Position: Lying)   Pulse 118   Temp 98.4 °F (36.9 °C) (Temporal)   Resp 18   Ht 160 cm (63\")   Wt 85.7 kg (189 lb)   SpO2 98%   BMI 33.48 kg/m²     Mental Status Exam  Mood: dysphoric  Affect: dysphoric   Thought Processes: linear, logical, and goal directed  Thought Content:  sensorium intact  Oriented X3:  yes  Suicidal Thoughts: denies        Medical Decision Making:   Labs:     Lab Results (last 24 hours)     Procedure Component Value Units Date/Time    Hepatitis Panel, Acute [[id number]]  (Abnormal) Collected: 10/02/20 1051    Specimen: Blood Updated: 10/02/20 2300    Narrative:      The following orders were created for panel order Hepatitis Panel, Acute.  Procedure                               Abnormality         Status                     ---------                               -----------         ------                     Hepatitis Panel, Acute[[id number]] "       Abnormal            Final result               Hep B Confirmation Tube[[id number]]                          Final result                 Please view results for these tests on the individual orders.    Hep B Confirmation Tube [[id number]] Collected: 10/02/20 1051    Specimen: Blood Updated: 10/02/20 2300     Extra Tube Hold for add-ons.     Comment: Auto resulted.       Hepatitis Panel, Acute [[id number]]  (Abnormal) Collected: 10/02/20 1051    Specimen: Blood Updated: 10/02/20 1216     Hepatitis B Surface Ag Non-Reactive     Hep A IgM Non-Reactive     Hep B C IgM Non-Reactive     Hepatitis C Ab Reactive    Narrative:      Results may be falsely decreased if patient taking Biotin.     Potassium [[id number]]  (Normal) Collected: 10/02/20 1051    Specimen: Blood Updated: 10/02/20 1133     Potassium 4.3 mmol/L      Comment: Slight hemolysis detected by analyzer. Results may be affected.               Radiology:     Imaging Results (Last 24 Hours)     ** No results found for the last 24 hours. **            EKG:     ECG/EMG Results (most recent)     Procedure Component Value Units Date/Time    ECG 12 Lead [[id number]] Collected: 10/01/20 1749     Updated: 10/02/20 1039    Narrative:      Test Reason : Baseline Cardiac Status  Blood Pressure : **/** mmHG  Vent. Rate : 082 BPM     Atrial Rate : 082 BPM     P-R Int : 146 ms          QRS Dur : 084 ms      QT Int : 402 ms       P-R-T Axes : 016 083 055 degrees     QTc Int : 469 ms    Normal sinus rhythm  Normal ECG  No previous ECGs available  Confirmed by Ligia Fortune ([id number]) on 10/2/2020 10:39:44 AM    Referred By:  YEMI           Confirmed By:Ligia Fortune    ECG 12 Lead [[id number]] Collected: 10/01/20 2303     Updated: 10/02/20 1042    Narrative:      Test Reason : epigastric pain/some chest pain? acute onset  Blood Pressure : **/** mmHG  Vent. Rate : 071 BPM     Atrial Rate : 071 BPM     P-R Int : 178 ms          QRS Dur : 084 ms      QT Int : 442 ms        P-R-T Axes : 056 073 057 degrees     QTc Int : 480 ms    Sinus rhythm with occasional premature ventricular complexes    Borderline ECG  When compared with ECG of 01-OCT-2020 17:49, (Unconfirmed)  premature ventricular complexes are now present  Confirmed by Ligia Fortune ([id number]) on 10/2/2020 10:42:48 AM    Referred By:  JOANNE           Confirmed By:Ligia Fortune           Medications:           All medications reviewed.      Benzodiazepine use disorder, severe, dependence  -Most concerning substance of withdrawal is reported use of alprazolam. ]  -Ativan detox protocol; continue as needed dosing for now given patient's lethargy  -Refer to rehab     Opioid use disorder, severe, dependence  -Patient reports regular use of buprenorphine  -Clonidine protocol  -Refer to rehab     Methamphetamine use disorder, severe, dependence  -Regular reported methamphetamine use  -Supportive care  -Refer to rehab     Abdominal pain  -Medicine consulted overnight for severe abdominal pain.  Appreciate involvement  -No emergent findings.  Thought to be related to withdrawal thus far  -10/2/20 abdominal CT with no signs of appendicitis or intra-abdominal pathology likely to cause severe abdominal pain.  Chest CT with no acute findings.  Pelvic CT did visualize left ovarian cyst 5 cm     Ovarian cyst  -Observed on 10/2/2020 pelvic CT  -Appears intact.  Unlikely to be causing acute pain  -Ultrasound in 6 to 8 weeks recommended     Hepatitis C  -Found on admission hepatitis panel  -We will  patient once mental status improves    Continue hospitalization for medical management of drug withdrawal and patient safety and stabilization.  Continue individual and group chemical dependency counseling.   Therapist and treatment team will continue to assist patient in establishing plans for follow-up and rehabilitation that will serve as the next step in care once patient has completed the medical detox.

## 2020-10-03 NOTE — PLAN OF CARE
Goal Outcome Evaluation:  Plan of Care Reviewed With: patient  Progress: improving  Outcome Summary: Pt in dayroom for only a few minutes. Pt slept all night. Pt didn't complain of withdrawal symptons except HA. Pt reported she couldn't wait to get this over with.

## 2020-10-04 PROCEDURE — 63710000001 ONDANSETRON PER 8 MG: Performed by: PSYCHIATRY & NEUROLOGY

## 2020-10-04 PROCEDURE — 99232 SBSQ HOSP IP/OBS MODERATE 35: CPT | Performed by: PSYCHIATRY & NEUROLOGY

## 2020-10-04 RX ORDER — LORAZEPAM 1 MG/1
1 TABLET ORAL EVERY 4 HOURS PRN
Status: DISCONTINUED | OUTPATIENT
Start: 2020-10-05 | End: 2020-10-04

## 2020-10-04 RX ORDER — LORAZEPAM 0.5 MG/1
0.5 TABLET ORAL
Status: COMPLETED | OUTPATIENT
Start: 2020-10-05 | End: 2020-10-05

## 2020-10-04 RX ORDER — LORAZEPAM 1 MG/1
1 TABLET ORAL
Status: DISCONTINUED | OUTPATIENT
Start: 2020-10-04 | End: 2020-10-04

## 2020-10-04 RX ORDER — LORAZEPAM 0.5 MG/1
0.5 TABLET ORAL
Status: DISCONTINUED | OUTPATIENT
Start: 2020-10-05 | End: 2020-10-04

## 2020-10-04 RX ORDER — LORAZEPAM 0.5 MG/1
0.5 TABLET ORAL EVERY 4 HOURS PRN
Status: DISCONTINUED | OUTPATIENT
Start: 2020-10-06 | End: 2020-10-04

## 2020-10-04 RX ORDER — LORAZEPAM 0.5 MG/1
0.5 TABLET ORAL EVERY 4 HOURS PRN
Status: ACTIVE | OUTPATIENT
Start: 2020-10-05 | End: 2020-10-06

## 2020-10-04 RX ORDER — LORAZEPAM 1 MG/1
1 TABLET ORAL EVERY 4 HOURS PRN
Status: ACTIVE | OUTPATIENT
Start: 2020-10-05 | End: 2020-10-05

## 2020-10-04 RX ORDER — LORAZEPAM 2 MG/1
2 TABLET ORAL
Status: DISCONTINUED | OUTPATIENT
Start: 2020-10-04 | End: 2020-10-04

## 2020-10-04 RX ADMIN — HYDROXYZINE HYDROCHLORIDE 50 MG: 50 TABLET ORAL at 14:15

## 2020-10-04 RX ADMIN — HYDROXYZINE HYDROCHLORIDE 50 MG: 50 TABLET ORAL at 08:41

## 2020-10-04 RX ADMIN — DICYCLOMINE HYDROCHLORIDE 10 MG: 10 CAPSULE ORAL at 08:41

## 2020-10-04 RX ADMIN — ONDANSETRON HYDROCHLORIDE 4 MG: 4 TABLET, FILM COATED ORAL at 14:15

## 2020-10-04 RX ADMIN — ACETAMINOPHEN 650 MG: 325 TABLET ORAL at 08:41

## 2020-10-04 RX ADMIN — LOPERAMIDE HYDROCHLORIDE 2 MG: 2 CAPSULE ORAL at 14:15

## 2020-10-04 RX ADMIN — CYCLOBENZAPRINE HYDROCHLORIDE 10 MG: 10 TABLET, FILM COATED ORAL at 08:41

## 2020-10-04 RX ADMIN — ONDANSETRON HYDROCHLORIDE 4 MG: 4 TABLET, FILM COATED ORAL at 08:41

## 2020-10-04 RX ADMIN — LORAZEPAM 1 MG: 1 TABLET ORAL at 21:49

## 2020-10-04 RX ADMIN — LORAZEPAM 1 MG: 1 TABLET ORAL at 14:16

## 2020-10-04 RX ADMIN — ACETAMINOPHEN 650 MG: 325 TABLET ORAL at 14:15

## 2020-10-04 NOTE — PLAN OF CARE
Problem: Adult Behavioral Health Plan of Care  Goal: Plan of Care Review  Outcome: Ongoing, Progressing  Flowsheets (Taken 10/4/2020 1183)  Progress: improving  Plan of Care Reviewed With: patient  Patient Agreement with Plan of Care: agrees  Outcome Summary: pt ort X 3.pt irritable at times.   Goal Outcome Evaluation:  Plan of Care Reviewed With: patient  Progress: improving  Outcome Summary: pt ort X 3.pt irritable at times.

## 2020-10-04 NOTE — PROGRESS NOTES
"    Detox Recovery Unit Progress Note   Clinician: Jeffrey Mendiola MD  Admission Date: 10/1/2020  10:52 EDT 10/04/20    Behavioral Health Treatment Plan and Problem List: I have reviewed and approved the Behavioral Health Treatment Plan and Problem list.    Allergies  Allergies   Allergen Reactions   • Nsaids Rash       Hospital Day: 3 days      Assessment completed within view of staff    History  CC: withdrawal symptoms    Interval HPI: Patient seen and evaluated by me.  Chart reviewed.  She is alert and awake this morning.  Lethargy has resolved.  Patient is withdrawing from methamphetamine, Suboxone, alprazolam.  Current withdrawal symptoms include: Hot and cold chills, nausea, malaise, fatigue, diarrhea, muscle aches.    ROS otherwise as below.        Interval Review of Systems:   General ROS: negative for - fever.  Positive for withdrawal symptoms mentioned above.  Endocrine ROS: negative for - palpitations  Respiratory ROS: no cough, shortness of breath, or wheezing  Cardiovascular ROS: no chest pain or dyspnea on exertion  Gastrointestinal ROS: no abdominal pain,no black or bloody stools    /82 (BP Location: Right arm, Patient Position: Sitting)   Pulse 114   Temp 96.7 °F (35.9 °C) (Temporal)   Resp 16   Ht 160 cm (63\")   Wt 85.7 kg (189 lb)   SpO2 98%   BMI 33.48 kg/m²     Mental Status Exam  Mood: dysphoric  Affect: dysphoric   Thought Processes: linear, logical, and goal directed  Thought Content:  sensorium intact  Oriented X3:  yes  Suicidal Thoughts: denies        Medical Decision Making:   Labs:     Lab Results (last 24 hours)     ** No results found for the last 24 hours. **            Radiology:     Imaging Results (Last 24 Hours)     ** No results found for the last 24 hours. **            EKG:     ECG/EMG Results (most recent)     Procedure Component Value Units Date/Time    ECG 12 Lead [[id number]] Collected: 10/01/20 1749     Updated: 10/02/20 1039    Narrative:      Test Reason : " Baseline Cardiac Status  Blood Pressure : **/** mmHG  Vent. Rate : 082 BPM     Atrial Rate : 082 BPM     P-R Int : 146 ms          QRS Dur : 084 ms      QT Int : 402 ms       P-R-T Axes : 016 083 055 degrees     QTc Int : 469 ms    Normal sinus rhythm  Normal ECG  No previous ECGs available  Confirmed by Ligia Fortune ([id number]) on 10/2/2020 10:39:44 AM    Referred By:  YEMI           Confirmed By:Ligia Fortune    ECG 12 Lead [[id number]] Collected: 10/01/20 2303     Updated: 10/02/20 1042    Narrative:      Test Reason : epigastric pain/some chest pain? acute onset  Blood Pressure : **/** mmHG  Vent. Rate : 071 BPM     Atrial Rate : 071 BPM     P-R Int : 178 ms          QRS Dur : 084 ms      QT Int : 442 ms       P-R-T Axes : 056 073 057 degrees     QTc Int : 480 ms    Sinus rhythm with occasional premature ventricular complexes    Borderline ECG  When compared with ECG of 01-OCT-2020 17:49, (Unconfirmed)  premature ventricular complexes are now present  Confirmed by Ligia Fortune ([id number]) on 10/2/2020 10:42:48 AM    Referred By:  JOANNE           Confirmed By:Ligia Fortune           Medications:           All medications reviewed.      Assessment and Plan:    Benzodiazepine use disorder, severe, dependence  -Lethargy has improved.  We will start Ativan detox protocol on day 3 today (1mg TID).    -Refer to rehab     Opioid use disorder, severe, dependence  -Patient reports regular use of buprenorphine  -Clonidine protocol  -Refer to rehab     Methamphetamine use disorder, severe, dependence  -Regular reported methamphetamine use  -Supportive care  -Refer to rehab     Abdominal pain  -Medicine consulted for  abdominal pain.  Appreciate involvement  -No emergent findings.  Thought to be related to withdrawal thus far  -10/2/20 abdominal CT with no signs of appendicitis or intra-abdominal pathology likely to cause severe abdominal pain.  Chest CT with no acute findings.  Pelvic CT did visualize left ovarian  cyst 5 cm     Ovarian cyst  -Observed on 10/2/2020 pelvic CT  -Appears intact.  Unlikely to be causing acute pain  -Ultrasound in 6 to 8 weeks recommended     Hepatitis C  -Found on admission hepatitis panel  -We will  patient once mental status improves      Continue hospitalization for medical management of drug withdrawal and patient safety and stabilization.  Continue individual and group chemical dependency counseling.   Therapist and treatment team will continue to assist patient in establishing plans for follow-up and rehabilitation that will serve as the next step in care once patient has completed the medical detox.

## 2020-10-04 NOTE — PROGRESS NOTES
Hospitalist note    Patient not physically seen by myself today.  Did speak with patient's nurse who states patient is doing well.  She is no longer confused or disoriented.  Patient did eat breakfast this morning.  No longer complaining of abdominal pain and no episodes of nausea or vomiting.  Patient has remained relatively stable over the past 3 days.  Patient does have intermittent episodes of tachycardia which is not unusual for a patient going through detox.  As there appears to be no acute underlying medical problems, I will sign off of this patient's case today.  Please feel free to call or reconsult with any questions.  Thank you for allowing us to participate in the care of this patient.

## 2020-10-04 NOTE — PLAN OF CARE
Problem: Adult Behavioral Health Plan of Care  Goal: Plan of Care Review  Outcome: Ongoing, Progressing  Flowsheets  Taken 10/4/2020 0410  Plan of Care Reviewed With: patient  Patient Agreement with Plan of Care: agrees  Taken 10/3/2020 2131  Plan of Care Reviewed With: patient  Patient Agreement with Plan of Care: agrees  Goal: Patient-Specific Goal (Individualization)  Outcome: Ongoing, Progressing  Goal: Adheres to Safety Considerations for Self and Others  Outcome: Ongoing, Progressing  Goal: Absence of New-Onset Illness or Injury  Outcome: Ongoing, Progressing  Goal: Optimized Coping Skills in Response to Life Stressors  Outcome: Ongoing, Progressing  Intervention: Promote Effective Coping Strategies  Recent Flowsheet Documentation  Taken 10/3/2020 2131 by Virginia Zaragoza RN  Supportive Measures: active listening utilized  Goal: Develops/Participates in Therapeutic [place] to Support Successful Transition  Outcome: Ongoing, Progressing  Intervention: Foster Therapeutic [place]  Recent Flowsheet Documentation  Taken 10/3/2020 2131 by Virginia Zaragoza RN  Trust Relationship/Rapport: care explained     Problem: Activity and Energy Impairment (Excessive Substance Use)  Goal: Optimized Energy Level (Excessive Substance Use)  Outcome: Ongoing, Progressing  Intervention: Optimize Energy Level  Recent Flowsheet Documentation  Taken 10/3/2020 2131 by Virginia Zaragoza RN  Activity (Behavioral Health): up ad yulia     Problem: Behavior Regulation Impairment (Excessive Substance Use)  Goal: Improved Behavioral Control (Excessive Substance Use)  Outcome: Ongoing, Progressing     Problem: Decreased Participation and Engagement (Excessive Substance Use)  Goal: Increased Participation and Engagement (Excessive Substance Use)  Outcome: Ongoing, Progressing  Intervention: Facilitate Participation and Engagement  Recent Flowsheet Documentation  Taken 10/3/2020 2131 by Virginia Zaragoza RN  Supportive Measures:  active listening utilized     Problem: Physiologic Impairment (Excessive Substance Use)  Goal: Improved Physiologic Symptoms (Excessive Substance Use)  Outcome: Ongoing, Progressing     Problem: Social, Occupational or Functional Impairment (Excessive Substance Use)  Goal: Enhanced Social, Occupational or Functional Skills (Excessive Substance Use)  Outcome: Ongoing, Progressing  Intervention: Promote Social, Occupational and Functional Ability  Recent Flowsheet Documentation  Taken 10/3/2020 2131 by Virginia Zaragoza, RN  Trust Relationship/Rapport: care explained     Problem: Fall Injury Risk  Goal: Absence of Fall and Fall-Related Injury  Outcome: Ongoing, Progressing   Goal Outcome Evaluation:  Plan of Care Reviewed With: patient  Progress: improving

## 2020-10-05 PROCEDURE — 99232 SBSQ HOSP IP/OBS MODERATE 35: CPT | Performed by: PSYCHIATRY & NEUROLOGY

## 2020-10-05 RX ADMIN — TRAZODONE HYDROCHLORIDE 50 MG: 50 TABLET ORAL at 21:43

## 2020-10-05 RX ADMIN — HYDROXYZINE HYDROCHLORIDE 50 MG: 50 TABLET ORAL at 08:28

## 2020-10-05 RX ADMIN — LORAZEPAM 0.5 MG: 0.5 TABLET ORAL at 14:03

## 2020-10-05 RX ADMIN — CYCLOBENZAPRINE HYDROCHLORIDE 10 MG: 10 TABLET, FILM COATED ORAL at 21:43

## 2020-10-05 RX ADMIN — CYCLOBENZAPRINE HYDROCHLORIDE 10 MG: 10 TABLET, FILM COATED ORAL at 08:28

## 2020-10-05 RX ADMIN — LORAZEPAM 0.5 MG: 0.5 TABLET ORAL at 08:28

## 2020-10-05 RX ADMIN — LORAZEPAM 0.5 MG: 0.5 TABLET ORAL at 21:44

## 2020-10-05 NOTE — DISCHARGE INSTR - APPOINTMENTS
Addiction Recovery Care ([facility])  [facility]  Admission directly upon discharge.

## 2020-10-05 NOTE — PLAN OF CARE
Problem: Adult Behavioral Health Plan of Care  Goal: Plan of Care Review  Outcome: Ongoing, Progressing  Flowsheets (Taken 10/5/2020 1800)  Plan of Care Reviewed With: patient  Patient Agreement with Plan of Care: agrees  Goal: Patient-Specific Goal (Individualization)  Outcome: Ongoing, Progressing  Goal: Adheres to Safety Considerations for Self and Others  Outcome: Ongoing, Progressing  Goal: Absence of New-Onset Illness or Injury  Outcome: Ongoing, Progressing  Goal: Optimized Coping Skills in Response to Life Stressors  Outcome: Ongoing, Progressing  Intervention: Promote Effective Coping Strategies  Recent Flowsheet Documentation  Taken 10/5/2020 0943 by Virginia Zaragoza RN  Supportive Measures: active listening utilized  Taken 10/5/2020 0920 by Virginia Zaragoza RN  Supportive Measures: active listening utilized  Goal: Develops/Participates in Therapeutic [place] to Support Successful Transition  Outcome: Ongoing, Progressing     Problem: Activity and Energy Impairment (Excessive Substance Use)  Goal: Optimized Energy Level (Excessive Substance Use)  Outcome: Ongoing, Progressing  Intervention: Optimize Energy Level  Recent Flowsheet Documentation  Taken 10/5/2020 0920 by Virginia Zaragoza RN  Activity (Behavioral Health): up ad yulia     Problem: Behavior Regulation Impairment (Excessive Substance Use)  Goal: Improved Behavioral Control (Excessive Substance Use)  Outcome: Ongoing, Progressing     Problem: Decreased Participation and Engagement (Excessive Substance Use)  Goal: Increased Participation and Engagement (Excessive Substance Use)  Outcome: Ongoing, Progressing  Intervention: Facilitate Participation and Engagement  Recent Flowsheet Documentation  Taken 10/5/2020 0943 by Virginia Zaragoza RN  Supportive Measures: active listening utilized  Taken 10/5/2020 0920 by Virginia Zaragoza RN  Supportive Measures: active listening utilized     Problem: Physiologic Impairment (Excessive  Substance Use)  Goal: Improved Physiologic Symptoms (Excessive Substance Use)  Outcome: Ongoing, Progressing     Problem: Social, Occupational or Functional Impairment (Excessive Substance Use)  Goal: Enhanced Social, Occupational or Functional Skills (Excessive Substance Use)  Outcome: Ongoing, Progressing     Problem: Fall Injury Risk  Goal: Absence of Fall and Fall-Related Injury  Outcome: Ongoing, Progressing   Goal Outcome Evaluation:  Plan of Care Reviewed With: patient  Progress: improving

## 2020-10-05 NOTE — PLAN OF CARE
Goal Outcome Evaluation:  Plan of Care Reviewed With: patient  Progress: improving  Outcome Summary: Reports anxiety 2/10, depression 2/10, cravings 1/10, body ache and leg cramps. Denies SI/HI/AVH. Slept well, appetite is fair and participated in group.

## 2020-10-05 NOTE — PROGRESS NOTES
"INPATIENT PSYCHIATRIC PROGRESS NOTE    Name:  Deepthi Zuñiga  :  1995  MRN:  [id number]  Visit Number:  [id number]  Length of stay:  4    SUBJECTIVE  CC/Focus of Exam: polysubstance use    INTERVAL HISTORY:  The patient reports she is feeling better today. She states she is no longer experiencing any abdominal and had diarrhea yesterday but today she has not had any episodes of diarrhea and there is also no report of constipation.  Depression rating 2/10  Anxiety rating 2/10  Sleep: good  Withdrawal sx: denies  Cravin/10    Review of Systems   Constitutional: Negative.    HENT: Negative.    Respiratory: Negative.    Cardiovascular: Negative.    Gastrointestinal: Negative.        OBJECTIVE    Temp:  [97.5 °F (36.4 °C)-98.1 °F (36.7 °C)] 98.1 °F (36.7 °C)  Heart Rate:  [] 109  Resp:  [18-20] 20  BP: (106-148)/(61-88) 106/61    MENTAL STATUS EXAM:  Appearance:Casually dressed, good hygeine.   Cooperation:Cooperative  Psychomotor: No psychomotor agitation/retardation, No EPS, No motor tics  Speech-normal rate, amount.  Mood \"aggravated\"   Affect-congruent, appropriate, stable  Thought Content-goal directed, no delusional material present  Thought process-linear, organized.  Suicidality: No SI  Homicidality: No HI  Perception: No AH/VH  Insight-fair   Judgement-fair    Lab Results (last 24 hours)     ** No results found for the last 24 hours. **             Imaging Results (Last 24 Hours)     ** No results found for the last 24 hours. **             ECG/EMG Results (most recent)     Procedure Component Value Units Date/Time    ECG 12 Lead [[id number]] Collected: 10/01/20 1749     Updated: 10/02/20 1039    Narrative:      Test Reason : Baseline Cardiac Status  Blood Pressure : **/** mmHG  Vent. Rate : 082 BPM     Atrial Rate : 082 BPM     P-R Int : 146 ms          QRS Dur : 084 ms      QT Int : 402 ms       P-R-T Axes : 016 083 055 degrees     QTc Int : 469 ms    Normal sinus rhythm  Normal ECG  No previous " ECGs available  Confirmed by Ligia Fortune ([id number]) on 10/2/2020 10:39:44 AM    Referred By:  YEMI           Confirmed By:Ligia Fortune    ECG 12 Lead [[id number]] Collected: 10/01/20 2303     Updated: 10/02/20 1042    Narrative:      Test Reason : epigastric pain/some chest pain? acute onset  Blood Pressure : **/** mmHG  Vent. Rate : 071 BPM     Atrial Rate : 071 BPM     P-R Int : 178 ms          QRS Dur : 084 ms      QT Int : 442 ms       P-R-T Axes : 056 073 057 degrees     QTc Int : 480 ms    Sinus rhythm with occasional premature ventricular complexes    Borderline ECG  When compared with ECG of 01-OCT-2020 17:49, (Unconfirmed)  premature ventricular complexes are now present  Confirmed by Ligia Forutne () on 10/2/2020 10:42:48 AM    Referred By:  JOANNE           Confirmed By:Ligia Fortune           ALLERGIES: Nsaids      Current Facility-Administered Medications:   •  acetaminophen (TYLENOL) tablet 650 mg, 650 mg, Oral, Q6H PRN, Alfred Reynoso MD, 650 mg at 10/04/20 1415  •  aluminum-magnesium hydroxide-simethicone (MAALOX MAX) 400-400-40 MG/5ML suspension 15 mL, 15 mL, Oral, Q6H PRN, Alfred Reynoso MD  •  benzonatate (TESSALON) capsule 100 mg, 100 mg, Oral, TID PRN, Alfred Reynoso MD  •  benztropine (COGENTIN) tablet 2 mg, 2 mg, Oral, Once PRN **OR** benztropine (COGENTIN) injection 1 mg, 1 mg, Intramuscular, Once PRN, Alfred Reynoso MD  •  [] cloNIDine (CATAPRES) tablet 0.1 mg, 0.1 mg, Oral, 4x Daily PRN, 0.1 mg at 10/02/20 1946 **FOLLOWED BY** [] cloNIDine (CATAPRES) tablet 0.1 mg, 0.1 mg, Oral, TID PRN, 0.1 mg at 10/03/20 2132 **FOLLOWED BY** [] cloNIDine (CATAPRES) tablet 0.1 mg, 0.1 mg, Oral, BID PRN **FOLLOWED BY** cloNIDine (CATAPRES) tablet 0.1 mg, 0.1 mg, Oral, Daily PRN, Alfred Reynoso MD  •  cyclobenzaprine (FLEXERIL) tablet 10 mg, 10 mg, Oral, TID PRN, Alfred Reynoso MD, 10 mg at 10/05/20 0828  •  dicyclomine (BENTYL) capsule 10 mg, 10 mg, Oral, TID PRN,  Alfred Reynoso MD, 10 mg at 10/04/20 0841  •  famotidine (PEPCID) tablet 20 mg, 20 mg, Oral, BID PRN, Alfred Reynoso MD  •  hydrOXYzine (ATARAX) tablet 50 mg, 50 mg, Oral, Q6H PRN, Alfred Reynoso MD, 50 mg at 10/05/20 0828  •  loperamide (IMODIUM) capsule 2 mg, 2 mg, Oral, Q2H PRN, Alfred Reynoso MD, 2 mg at 10/04/20 1415  •  LORazepam (ATIVAN) tablet 0.5 mg, 0.5 mg, Oral, 3 times per day, Alfred Reynoso MD, 0.5 mg at 10/05/20 0828  •  LORazepam (ATIVAN) tablet 0.5 mg, 0.5 mg, Oral, Q4H PRN, Alfred Reynoso MD  •  magnesium hydroxide (MILK OF MAGNESIA) suspension 2400 mg/10mL 10 mL, 10 mL, Oral, Daily PRN, Alfred Reynoso MD  •  ondansetron (ZOFRAN) tablet 4 mg, 4 mg, Oral, Q6H PRN, Alfred Reynoso MD, 4 mg at 10/04/20 1415  •  potassium chloride (MICRO-K) CR capsule 40 mEq, 40 mEq, Oral, PRN **OR** potassium chloride (KLOR-CON) packet 40 mEq, 40 mEq, Oral, PRN **OR** potassium chloride 10 mEq in 100 mL IVPB, 10 mEq, Intravenous, Q1H PRN, Wang Car MD  •  sodium chloride nasal spray 2 spray, 2 spray, Each Nare, PRN, Alfred Reynoso MD  •  traZODone (DESYREL) tablet 50 mg, 50 mg, Oral, Nightly PRN, Alfred Reynoso MD, 50 mg at 10/01/20 2121    ASSESSMENT & PLAN:    Benzodiazepine use disorder, severe, dependence  -Patient on last day of Ativan detox. She is encouraged to complete it and then go to rehab.       Opioid use disorder, severe, dependence  -Patient reports regular use of buprenorphine  -Clonidine protocol  -Refer to rehab     Methamphetamine use disorder, severe, dependence  -Regular reported methamphetamine use  -Supportive care  -Refer to rehab     Abdominal pain  -Resolved, appreciate hospitalist consult     Ovarian cyst  -Observed on 10/2/2020 pelvic CT  -Appears intact.  Unlikely to be causing acute pain  -Ultrasound in 6 to 8 weeks recommended     Hepatitis C  -Found on admission hepatitis panel  -Patient is aware of her Hep C positive statues and is encouraged to maintain sobriety and she  will be eligible for outpatient treatment.    Special precautions: Special Precautions Level 4 (q30 min checks).    Behavioral Health Treatment Plan and Problem List: I have reviewed and approved the Behavioral Health Treatment Plan and Problem list.  The patient has had a chance to review and agrees with the treatment plan.     Clinician:  Alfred Reynoso MD  10/05/20  13:48 EDT

## 2020-10-05 NOTE — PLAN OF CARE
Problem: Adult Behavioral Health Plan of Care  Goal: Plan of Care Review  Outcome: Ongoing, Progressing  Flowsheets  Taken 10/5/2020 1424 by Silvano Kilgore LCSW  Consent Given to Review Plan with: [facility]  Plan of Care Reviewed With: patient  Patient Agreement with Plan of Care: agrees  Outcome Summary: Met with patient in the office, assessing her needs and assisting her in contacting [facility] to arrange aftercare plans. Patient agreeable.  Taken 10/5/2020 0548 by Myranda Navarrete RN  Progress: improving       [id number]  D: Patient reported she was disappointed about not going home today. She remained agreeable for direct admission to [facility]. Called and spoke with Jessica, , notifying her of discharge date planned for tomorrow. Patient requested admission to [facility] instead of [facility]. Jessica obliged and reported transportation would be scheduled.     A: Patient's affect appeared mildly irritable. She was polite and cooperative. She seemed to have a good degree of motivation to change.     P: Patient will be discharged tomorrow. [facility] will provide transportation to [facility].

## 2020-10-06 VITALS
WEIGHT: 189 LBS | TEMPERATURE: 98.6 F | HEART RATE: 90 BPM | OXYGEN SATURATION: 99 % | SYSTOLIC BLOOD PRESSURE: 120 MMHG | RESPIRATION RATE: 18 BRPM | DIASTOLIC BLOOD PRESSURE: 83 MMHG | BODY MASS INDEX: 33.49 KG/M2 | HEIGHT: 63 IN

## 2020-10-06 PROBLEM — F15.20 METHAMPHETAMINE USE DISORDER, SEVERE (HCC): Status: ACTIVE | Noted: 2020-10-06

## 2020-10-06 PROBLEM — F13.20 SEVERE BENZODIAZEPINE USE DISORDER (HCC): Status: ACTIVE | Noted: 2020-10-06

## 2020-10-06 PROBLEM — F11.20 OPIOID USE DISORDER, SEVERE, DEPENDENCE (HCC): Status: ACTIVE | Noted: 2020-10-06

## 2020-10-06 PROCEDURE — 99238 HOSP IP/OBS DSCHRG MGMT 30/<: CPT | Performed by: PSYCHIATRY & NEUROLOGY

## 2020-10-06 NOTE — PLAN OF CARE
Goal Outcome Evaluation:  Plan of Care Reviewed With: patient  Progress: improving  Outcome Summary: Pt wanted to leave this shift. Pt was encouraged to stay until am. Pt reports she is going to pia benson

## 2020-10-06 NOTE — PLAN OF CARE
Goal Outcome Evaluation:  Plan of Care Reviewed With: patient  Progress: improving  Outcome Summary: pt ort X 3.pt irritable at times.

## 2020-10-06 NOTE — DISCHARGE SUMMARY
:  1995  MRN:  [id number]  Visit Number:  [id number]      Date of Admission:10/1/2020   Date of Discharge:  10/6/2020    Discharge Diagnosis:  Active Problems:    Severe benzodiazepine use disorder (CMS/HCC)    Opioid use disorder, severe, dependence (CMS/HCC)    Methamphetamine use disorder, severe (CMS/[facility])        Admission Diagnosis:  Polysubstance abuse (CMS/[facility]) [F19.10]     NAS Zuñiga is a 25 y.o. female who was admitted on 10/1/2020 with complaints of polysubstance abuse. She has a recent history of abusing methamphetamine, buprenorphine, alprazolam.  She experienced severe abdominal and back pain yesterday, prompting him consult to medicine.  Symptoms considered to be related to withdrawal.  This morning, she was very uncomfortable, at times restless, anxious and other times almost lethargic and somnolent.  She was difficult to obtain much history from.  She struggled to report how much of the above-mentioned drugs she had taken.  She began to fall asleep during our conversation and for concern of her safety, we ended the discussion for her to go rest so she can recover and be more engaged with treatment.      Hospital Course  Patient is a 25 y.o. female presented with polysubstance use. The patient was admitted to the [facility] detox recovery unit for safety, further evaluation and treatment.  The patient was treated with Ativan and clonidine detox regimens and was able to complete the detox without any complications. She reported abdominal pain and hospitalist consult was done and workup was negative and her symptoms also improved.   The patient was also able to take part in individual and group counseling sessions and work on appropriate coping skills.  The patient made steady improvement in her mood and expressed feeling more positive and hopeful about future. Sleep and appetite were improved.  The day of discharge the patient was calm, cooperative and pleasant. Mood was reported to  "be good, and denied SI/HI/AVH. Also reported no medication side effects.  .      Mental Status Exam upon discharge:   Mood \"good\"   Affect-congruent, appropriate, stable  Thought Content-goal directed, no delusional material present  Thought process-linear, organized.  Suicidality: No SI  Homicidality: No HI  Perception: No AH/VH    Procedures Performed         Consults:   Consults     Date and Time Order Name Status Description    10/1/2020 3791 Inpatient Hospitalist Consult Completed           Pertinent Test Results:            Lab Results   Component Value Date     GLUCOSE 112 (H) 10/01/2020     BUN 11 10/01/2020     CREATININE 0.78 10/01/2020     EGFRIFNONA 89 10/01/2020     BCR 14.1 10/01/2020     CO2 24.9 10/01/2020     CALCIUM 8.2 (L) 10/01/2020     ALBUMIN 3.60 10/01/2020     AST 14 10/01/2020     ALT 15 10/01/2020               Lab Results   Component Value Date     WBC 7.00 10/01/2020     HGB 11.8 (L) 10/01/2020     HCT 37.1 10/01/2020     MCV 87.9 10/01/2020      10/01/2020                 Last Urine Toxicity         LAST URINE TOXICITY RESULTS Latest Ref Rng & Units 10/1/2020 9/29/2020     AMPHETAMINES SCREEN, URINE Negative Positive(A) -     BARBITURATES SCREEN Negative Negative None Detected     BENZODIAZEPINE SCREEN, URINE Negative Positive(A) None Detected     BUPRENORPHINEUR Negative Positive(A) -     COCAINE SCREEN, URINE Negative Negative None Detected     METHADONE SCREEN, URINE Negative Negative -                                     Brief Urine Lab Results  (Last result in the past 365 days)       Color   Clarity   Blood   Leuk Est   Nitrite   Protein   CREAT   Urine HCG         10/01/20 1411                             Negative       10/01/20 1411 Dark Yellow Cloudy Negative Negative Negative Negative                        Condition on Discharge:  improved    Vital Signs  Temp:  [97.8 °F (36.6 °C)-98.7 °F (37.1 °C)] 98.6 °F (37 °C)  Heart Rate:  [] 100  Resp:  [18-20] 18  BP: " (106129)/(59-83) 129/82      Discharge Disposition:  Home or Self Care    Discharge Medications:     Discharge Medications      Patient Not Prescribed Medications Upon Discharge         Discharge Diet: Regular    Activity at Discharge: As tolerated    Follow-up Appointments        Addiction [facility] ([facility])  Stefan Bateman  [phone]  Admission directly upon discharge.          Test Results Pending at Discharge      Time spent in discharge: < 30 min    Clinician:   Alfred Reynoso MD  10/06/20  11:46 EDT